# Patient Record
Sex: MALE | Race: WHITE | NOT HISPANIC OR LATINO
[De-identification: names, ages, dates, MRNs, and addresses within clinical notes are randomized per-mention and may not be internally consistent; named-entity substitution may affect disease eponyms.]

---

## 2017-10-06 ENCOUNTER — APPOINTMENT (OUTPATIENT)
Dept: MRI IMAGING | Facility: CLINIC | Age: 57
End: 2017-10-06

## 2017-10-06 ENCOUNTER — OUTPATIENT (OUTPATIENT)
Dept: OUTPATIENT SERVICES | Facility: HOSPITAL | Age: 57
LOS: 1 days | End: 2017-10-06
Payer: COMMERCIAL

## 2017-10-06 DIAGNOSIS — M25.512 PAIN IN LEFT SHOULDER: ICD-10-CM

## 2017-10-06 DIAGNOSIS — M79.622 PAIN IN LEFT UPPER ARM: ICD-10-CM

## 2017-10-06 DIAGNOSIS — Z00.8 ENCOUNTER FOR OTHER GENERAL EXAMINATION: ICD-10-CM

## 2017-10-06 PROCEDURE — 73221 MRI JOINT UPR EXTREM W/O DYE: CPT | Mod: 26,LT

## 2017-10-06 PROCEDURE — 73221 MRI JOINT UPR EXTREM W/O DYE: CPT

## 2018-01-06 ENCOUNTER — APPOINTMENT (OUTPATIENT)
Dept: MRI IMAGING | Facility: CLINIC | Age: 58
End: 2018-01-06

## 2018-01-06 ENCOUNTER — OUTPATIENT (OUTPATIENT)
Dept: OUTPATIENT SERVICES | Facility: HOSPITAL | Age: 58
LOS: 1 days | End: 2018-01-06
Payer: COMMERCIAL

## 2018-01-06 DIAGNOSIS — Z00.8 ENCOUNTER FOR OTHER GENERAL EXAMINATION: ICD-10-CM

## 2018-01-06 PROCEDURE — 73221 MRI JOINT UPR EXTREM W/O DYE: CPT

## 2018-01-06 PROCEDURE — 73221 MRI JOINT UPR EXTREM W/O DYE: CPT | Mod: 26,RT

## 2018-04-07 ENCOUNTER — APPOINTMENT (OUTPATIENT)
Dept: MRI IMAGING | Facility: CLINIC | Age: 58
End: 2018-04-07
Payer: COMMERCIAL

## 2018-04-07 ENCOUNTER — OUTPATIENT (OUTPATIENT)
Dept: OUTPATIENT SERVICES | Facility: HOSPITAL | Age: 58
LOS: 1 days | End: 2018-04-07
Payer: COMMERCIAL

## 2018-04-07 DIAGNOSIS — M54.2 CERVICALGIA: ICD-10-CM

## 2018-04-07 PROCEDURE — 72141 MRI NECK SPINE W/O DYE: CPT | Mod: 26

## 2018-04-07 PROCEDURE — 72141 MRI NECK SPINE W/O DYE: CPT

## 2018-04-12 DIAGNOSIS — M50.222 OTHER CERVICAL DISC DISPLACEMENT AT C5-C6 LEVEL: ICD-10-CM

## 2018-04-12 DIAGNOSIS — M50.221 OTHER CERVICAL DISC DISPLACEMENT AT C4-C5 LEVEL: ICD-10-CM

## 2018-04-12 DIAGNOSIS — M50.21 OTHER CERVICAL DISC DISPLACEMENT, HIGH CERVICAL REGION: ICD-10-CM

## 2018-04-12 DIAGNOSIS — M50.223 OTHER CERVICAL DISC DISPLACEMENT AT C6-C7 LEVEL: ICD-10-CM

## 2018-12-15 ENCOUNTER — OUTPATIENT (OUTPATIENT)
Dept: OUTPATIENT SERVICES | Facility: HOSPITAL | Age: 58
LOS: 1 days | End: 2018-12-15
Payer: COMMERCIAL

## 2018-12-15 ENCOUNTER — APPOINTMENT (OUTPATIENT)
Dept: RADIOLOGY | Facility: CLINIC | Age: 58
End: 2018-12-15
Payer: COMMERCIAL

## 2018-12-15 DIAGNOSIS — M53.3 SACROCOCCYGEAL DISORDERS, NOT ELSEWHERE CLASSIFIED: ICD-10-CM

## 2018-12-15 PROCEDURE — 72220 X-RAY EXAM SACRUM TAILBONE: CPT

## 2018-12-15 PROCEDURE — 72220 X-RAY EXAM SACRUM TAILBONE: CPT | Mod: 26

## 2019-01-05 ENCOUNTER — APPOINTMENT (OUTPATIENT)
Dept: ORTHOPEDIC SURGERY | Facility: CLINIC | Age: 59
End: 2019-01-05
Payer: COMMERCIAL

## 2019-01-05 VITALS — HEART RATE: 81 BPM | SYSTOLIC BLOOD PRESSURE: 122 MMHG | DIASTOLIC BLOOD PRESSURE: 72 MMHG

## 2019-01-05 DIAGNOSIS — Z87.39 PERSONAL HISTORY OF OTHER DISEASES OF THE MUSCULOSKELETAL SYSTEM AND CONNECTIVE TISSUE: ICD-10-CM

## 2019-01-05 DIAGNOSIS — Z80.9 FAMILY HISTORY OF MALIGNANT NEOPLASM, UNSPECIFIED: ICD-10-CM

## 2019-01-05 DIAGNOSIS — Z78.9 OTHER SPECIFIED HEALTH STATUS: ICD-10-CM

## 2019-01-05 DIAGNOSIS — Z86.69 PERSONAL HISTORY OF OTHER DISEASES OF THE NERVOUS SYSTEM AND SENSE ORGANS: ICD-10-CM

## 2019-01-05 DIAGNOSIS — G82.50 QUADRIPLEGIA, UNSPECIFIED: ICD-10-CM

## 2019-01-05 DIAGNOSIS — M60.9 MYOSITIS, UNSPECIFIED: ICD-10-CM

## 2019-01-05 PROCEDURE — 72040 X-RAY EXAM NECK SPINE 2-3 VW: CPT

## 2019-01-05 PROCEDURE — 99204 OFFICE O/P NEW MOD 45 MIN: CPT

## 2019-01-05 RX ORDER — MELOXICAM 15 MG/1
15 TABLET ORAL
Refills: 0 | Status: ACTIVE | COMMUNITY

## 2020-11-07 ENCOUNTER — APPOINTMENT (OUTPATIENT)
Dept: MRI IMAGING | Facility: CLINIC | Age: 60
End: 2020-11-07
Payer: COMMERCIAL

## 2020-11-07 ENCOUNTER — OUTPATIENT (OUTPATIENT)
Dept: OUTPATIENT SERVICES | Facility: HOSPITAL | Age: 60
LOS: 1 days | End: 2020-11-07
Payer: COMMERCIAL

## 2020-11-07 DIAGNOSIS — Z00.8 ENCOUNTER FOR OTHER GENERAL EXAMINATION: ICD-10-CM

## 2020-11-07 PROCEDURE — 70551 MRI BRAIN STEM W/O DYE: CPT

## 2020-11-07 PROCEDURE — 70551 MRI BRAIN STEM W/O DYE: CPT | Mod: 26

## 2020-12-24 ENCOUNTER — OUTPATIENT (OUTPATIENT)
Dept: OUTPATIENT SERVICES | Facility: HOSPITAL | Age: 60
LOS: 1 days | End: 2020-12-24
Payer: COMMERCIAL

## 2020-12-24 ENCOUNTER — APPOINTMENT (OUTPATIENT)
Dept: MRI IMAGING | Facility: CLINIC | Age: 60
End: 2020-12-24
Payer: COMMERCIAL

## 2020-12-24 DIAGNOSIS — Z00.8 ENCOUNTER FOR OTHER GENERAL EXAMINATION: ICD-10-CM

## 2020-12-24 PROCEDURE — 72141 MRI NECK SPINE W/O DYE: CPT

## 2020-12-24 PROCEDURE — 72141 MRI NECK SPINE W/O DYE: CPT | Mod: 26

## 2022-09-15 ENCOUNTER — APPOINTMENT (OUTPATIENT)
Dept: ORTHOPEDIC SURGERY | Facility: CLINIC | Age: 62
End: 2022-09-15

## 2022-09-15 VITALS — WEIGHT: 150 LBS | BODY MASS INDEX: 24.11 KG/M2 | HEIGHT: 66 IN

## 2022-09-15 PROCEDURE — 99214 OFFICE O/P EST MOD 30 MIN: CPT

## 2022-09-15 NOTE — IMAGING
Problem: Pain - Standard  Goal: Alleviation of pain or a reduction in pain to the patient’s comfort goal  Outcome: Progressing     Problem: Safety - Medical Restraint  Goal: Remains free of injury from restraints (Restraint for Interference with Medical Device)  Outcome: Progressing  Goal: Free from restraint(s) (Restraint for Interference with Medical Device)  Outcome: Not Progressing   The patient is Watcher - Medium risk of patient condition declining or worsening    Shift Goals  Clinical Goals: Neuro assessment Q 2hrs and hemodynamic stability  Patient Goals: FELIPA  Family Goals: FELIPA    Progress made toward(s) clinical / shift goals:  Neuro checks every two hours. Pt restraints assessed every 2 hours and prn. Pt pulling at lines and tubes. Pt given pain medication per pain scale. Pt resting comfortably after pain medication.     Patient is not progressing towards the following goals:      Problem: Safety - Medical Restraint  Goal: Free from restraint(s) (Restraint for Interference with Medical Device)  Outcome: Not Progressing      [de-identified] : The patient uses a wheelchair\par \par Cervical spine\par Inspection normal\par Mild tenderness trapezius bilaterally\par Negative foraminal closing test\par Motor upper extremities-supraspinatus strength is 5 minus/5 bilaterally, remainder normal\par \par Right shoulder\par No swelling\par Active forward flexion 170°, external rotation 60°, internal rotation lower lumbar region.  \par Mild tenderness anterior and posterior shoulder\par Mild crepitation with passive motion\par Radial pulse 2+\par \par Left shoulder\par No swelling\par Active forward flexion 170°, external rotation 60°, internal rotation lower lumbar region\par Mild tenderness anterior shoulder\par Mild crepitation with passive motion\par Radial pulse 2+\par

## 2022-09-15 NOTE — REASON FOR VISIT
[FreeTextEntry2] : Patient complains of increased Neck pain Continued pain B Shoulders.///////LAST SEEN 3/31/2022

## 2022-09-15 NOTE — DATA REVIEWED
[MRI] : MRI [Cervical Spine] : cervical spine [I independently reviewed and interpreted images and report] : I independently reviewed and interpreted images and report [FreeTextEntry1] : MRI cervical spine done in [place] August 22, 2022 was reviewed. There is multilevel degenerative disc disease.  Mild to moderate kyphosis. There is mild disc/osteophyte at C3-4.  Mild to moderate distress osteophyte at C4-5 with mild stenosis.  Moderate disc/osteophyte at C5-6 with moderate stenosis and cord compression.  The stenosis at C5-6 appears to have progressed compared to prior MRI done in [facility] December 24, 2020

## 2022-09-15 NOTE — HISTORY OF PRESENT ILLNESS
[Neck] : neck [Gradual] : gradual [Dull/Aching] : dull/aching [Intermittent] : intermittent [Leisure] : leisure [Rest] : rest [Retired] : Work status: retired [de-identified] : The patient has had some increased back pain over the past several months.  He has mild to moderate pain with movement.  Pain radiates to his right posterior parascapular region.  No numbness or weakness in his upper extremities.  He had MRI cervical spine\par He has continued mild to moderate pain in her shoulders reaching above him and behind him.  Occasional awakening from sleep.  Taking ibuprofen p.r.n..  He has not been taking meloxicam [] : Post Surgical Visit: no [de-identified] : 3/31/2022 [de-identified] : Dr. Wiley  [de-identified] : MRI

## 2022-09-15 NOTE — DISCUSSION/SUMMARY
[de-identified] : Continue home exercises\par Moist heat to his neck p.r.n.\par Ice to his shoulders p.r.n.\par Continue ibuprofen p.r.n.\par The patient stop working January 2, 2019 secondary to physical limitations\par Recommend he have neurosurgery consult with Dr. Edgardo Restrepo to discuss long-term treatment options.  Also recommend he have followup spine consult with Dr. Man Gautam in [place] where he lives\par If for any reason he develops any significant neurologic changes in upper extremities, weakness, advised to go to emergency room immediately\par \par Impression:\par Cervical strain/spondylosis/stenosis\par Mild to moderate osteoarthritis right shoulder/rotator cuff tendinitis\par Mild loss arthritis left shoulder/rotator cuff tendinitis\par

## 2023-03-30 ENCOUNTER — APPOINTMENT (OUTPATIENT)
Dept: ORTHOPEDIC SURGERY | Facility: CLINIC | Age: 63
End: 2023-03-30
Payer: COMMERCIAL

## 2023-03-30 VITALS — BODY MASS INDEX: 24.11 KG/M2 | WEIGHT: 150 LBS | HEIGHT: 66 IN

## 2023-03-30 PROCEDURE — 99214 OFFICE O/P EST MOD 30 MIN: CPT

## 2023-03-30 NOTE — IMAGING
[de-identified] : The patient uses a wheelchair\par \par Cervical spine\par Inspection normal\par Mild tenderness trapezius bilaterally\par Negative foraminal closing test\par Motor upper extremities-supraspinatus strength is 5 minus/5 bilaterally, remainder normal\par \par Right shoulder and arm\par Prominence of the biceps muscle belly mid arm\par Active forward flexion 170°, external rotation 60°, internal rotation lower lumbar region.  \par Mild tenderness anterior and posterior shoulder\par Mild crepitation with passive motion\par Radial pulse 2+\par \par Left shoulder\par No swelling\par Active forward flexion 170°, external rotation 60°, internal rotation lower lumbar region\par Mild tenderness anterior shoulder\par Mild crepitation with passive motion\par Radial pulse 2+\par

## 2023-03-30 NOTE — DISCUSSION/SUMMARY
[de-identified] : Continue home exercises\par Moist heat to his neck p.r.n.\par Ice to his shoulders p.r.n.\par Continue ibuprofen p.r.n.\par The patient stop working January 2, 2019 secondary to physical limitations\par Continue spine follow-up with Dr. Man Gautam in [place] where he lives\par \par Impression:\par Cervical strain/spondylosis/stenosis\par Mild to moderate osteoarthritis right shoulder/rotator cuff tendinitis\par Status post rupture right proximal biceps tendon\par Mild osteoarthritis left shoulder/rotator cuff tendinitis\par

## 2023-03-30 NOTE — HISTORY OF PRESENT ILLNESS
[Neck] : neck [Left Arm] : left arm [Right Arm] : right arm [Gradual] : gradual [5] : 5 [Intermittent] : intermittent [Leisure] : leisure [Rest] : rest [Exercising] : exercising [Retired] : Work status: retired [de-identified] : The patient has continued neck and bilateral shoulder pain\par He ruptured his right biceps tendon 2 weeks ago lifting his wheelchair.  He had evaluation in [place] and MRI.  He is feeling better now.\par He has mild to moderate pain and stiffness in his neck with movement.  No radicular complaints\par He has continued mild to moderate pain in his shoulders reaching above him and behind him.  Occasional awakening from sleep at night.  Taking meloxicam as needed [] : Post Surgical Visit: no [de-identified] : 9/15/2022 [de-identified] :

## 2023-06-30 ENCOUNTER — OFFICE (OUTPATIENT)
Dept: URBAN - METROPOLITAN AREA CLINIC 109 | Facility: CLINIC | Age: 63
Setting detail: OPHTHALMOLOGY
End: 2023-06-30
Payer: COMMERCIAL

## 2023-06-30 DIAGNOSIS — H34.211: ICD-10-CM

## 2023-06-30 DIAGNOSIS — H40.033: ICD-10-CM

## 2023-06-30 DIAGNOSIS — H02.834: ICD-10-CM

## 2023-06-30 DIAGNOSIS — H35.362: ICD-10-CM

## 2023-06-30 DIAGNOSIS — H02.831: ICD-10-CM

## 2023-06-30 DIAGNOSIS — H25.13: ICD-10-CM

## 2023-06-30 PROCEDURE — 99213 OFFICE O/P EST LOW 20 MIN: CPT | Performed by: OPHTHALMOLOGY

## 2023-06-30 PROCEDURE — 92020 GONIOSCOPY: CPT | Performed by: OPHTHALMOLOGY

## 2023-06-30 ASSESSMENT — REFRACTION_MANIFEST
OS_SPHERE: +2.50
OD_VA1: 20/20
OD_SPHERE: +3.00
OS_VA1: 20/20

## 2023-06-30 ASSESSMENT — CONFRONTATIONAL VISUAL FIELD TEST (CVF)
OD_FINDINGS: FULL
OS_FINDINGS: FULL

## 2023-06-30 ASSESSMENT — REFRACTION_CURRENTRX
OS_OVR_VA: 20/
OS_SPHERE: +1.75
OD_OVR_VA: 20/
OD_SPHERE: +1.75

## 2023-06-30 ASSESSMENT — VISUAL ACUITY
OS_BCVA: 20/20-
OD_BCVA: 20/20-

## 2023-06-30 ASSESSMENT — TONOMETRY
OD_IOP_MMHG: 15
OS_IOP_MMHG: 15

## 2023-09-06 ENCOUNTER — APPOINTMENT (OUTPATIENT)
Dept: ORTHOPEDIC SURGERY | Facility: CLINIC | Age: 63
End: 2023-09-06

## 2023-12-21 ENCOUNTER — APPOINTMENT (OUTPATIENT)
Dept: ORTHOPEDIC SURGERY | Facility: CLINIC | Age: 63
End: 2023-12-21
Payer: COMMERCIAL

## 2023-12-21 VITALS — BODY MASS INDEX: 24.11 KG/M2 | HEIGHT: 66 IN | WEIGHT: 150 LBS

## 2023-12-21 DIAGNOSIS — M48.02 SPINAL STENOSIS, CERVICAL REGION: ICD-10-CM

## 2023-12-21 DIAGNOSIS — M75.51 BURSITIS OF RIGHT SHOULDER: ICD-10-CM

## 2023-12-21 DIAGNOSIS — M19.011 PRIMARY OSTEOARTHRITIS, RIGHT SHOULDER: ICD-10-CM

## 2023-12-21 DIAGNOSIS — M47.812 SPONDYLOSIS W/OUT MYELOPATHY OR RADICULOPATHY, CERVICAL REGION: ICD-10-CM

## 2023-12-21 DIAGNOSIS — G82.20 PARAPLEGIA, UNSPECIFIED: ICD-10-CM

## 2023-12-21 DIAGNOSIS — M19.012 PRIMARY OSTEOARTHRITIS, RIGHT SHOULDER: ICD-10-CM

## 2023-12-21 DIAGNOSIS — M75.52 BURSITIS OF LEFT SHOULDER: ICD-10-CM

## 2023-12-21 DIAGNOSIS — S46.211A STRAIN OF MUSCLE, FASCIA AND TENDON OF OTHER PARTS OF BICEPS, RIGHT ARM, INITIAL ENCOUNTER: ICD-10-CM

## 2023-12-21 PROCEDURE — 99214 OFFICE O/P EST MOD 30 MIN: CPT

## 2023-12-21 RX ORDER — METHOCARBAMOL 500 MG/1
500 TABLET, FILM COATED ORAL
Qty: 90 | Refills: 2 | Status: ACTIVE | COMMUNITY
Start: 2023-12-21 | End: 1900-01-01

## 2023-12-21 NOTE — DISCUSSION/SUMMARY
[Medication Risks Reviewed] : Medication risks reviewed [de-identified] : Continue home exercises Moist heat to his neck p.r.n. Ice to his shoulders p.r.n. Continue ibuprofen p.r.n. or meloxicam prn.  He can try methocarbamol 500 mg 1-2 every 8 hours as needed pain/spasm.  Not to take this if he is driving or needs to be alert The patient stop working January 2, 2019 secondary to physical limitations Continue spine follow-up with Dr. Man Gautam in [place] where he lives Continue motorized assist when traveling in his wheelchair long distances I discussed possible hyaluronate injections for his shoulders  Impression: Cervical strain/spondylosis/stenosis Mild to moderate osteoarthritis right shoulder/rotator cuff tendinitis Status post rupture right proximal biceps tendon Mild osteoarthritis left shoulder/rotator cuff tendinitis

## 2023-12-21 NOTE — IMAGING
[de-identified] : The patient uses a wheelchair  Cervical spine Inspection normal Mild to moderate decrease in active range of motion Mild tenderness trapezius bilaterally.  Mild spasm trapezius bilaterally Negative foraminal closing test Motor upper extremities-supraspinatus strength is 5 minus/5 bilaterally, remainder normal  Right shoulder and arm Prominence of the biceps muscle belly mid arm Active forward flexion 160, external rotation 60, internal rotation lower lumbar region.   Mild tenderness anterior and posterior shoulder Mild crepitation with passive motion Radial pulse 2+  Left shoulder No swelling Active forward flexion 170, external rotation 60, internal rotation lower lumbar region Mild tenderness anterior shoulder Mild crepitation with passive motion Radial pulse 2+

## 2023-12-21 NOTE — HISTORY OF PRESENT ILLNESS
[Neck] : neck [Left Arm] : left arm [Right Arm] : right arm [Gradual] : gradual [7] : 7 [Intermittent] : intermittent [Leisure] : leisure [Rest] : rest [Retired] : Work status: retired [de-identified] : The patient has continued neck and bilateral shoulder pain He has mild to moderate pain and stiffness in his neck with movement.  No radicular complaints.  Occasional spasms He has continued pain in both shoulders.  Mild to moderate pain reaching above him and behind him.  Occasional awakening from sleep at night.  He has been using motorized device to assist with mobility when he goes long distances in his wheelchair [] : Post Surgical Visit: no [de-identified] : 3/30/2023 [de-identified] :

## 2024-03-06 ENCOUNTER — APPOINTMENT (RX ONLY)
Dept: URBAN - METROPOLITAN AREA CLINIC 146 | Facility: CLINIC | Age: 64
Setting detail: DERMATOLOGY
End: 2024-03-06

## 2024-03-06 DIAGNOSIS — L82.1 OTHER SEBORRHEIC KERATOSIS: ICD-10-CM

## 2024-03-06 DIAGNOSIS — L21.8 OTHER SEBORRHEIC DERMATITIS: ICD-10-CM | Status: INADEQUATELY CONTROLLED

## 2024-03-06 DIAGNOSIS — L81.4 OTHER MELANIN HYPERPIGMENTATION: ICD-10-CM

## 2024-03-06 PROCEDURE — ? PRESCRIPTION

## 2024-03-06 PROCEDURE — 99204 OFFICE O/P NEW MOD 45 MIN: CPT

## 2024-03-06 PROCEDURE — ? COUNSELING

## 2024-03-06 RX ORDER — CLINDAMYCIN PHOSPHATE 10 MG/ML
SOLUTION TOPICAL
Qty: 60 | Refills: 1 | Status: ERX | COMMUNITY
Start: 2024-03-06

## 2024-03-06 RX ORDER — CLOBETASOL PROPIONATE 0.5 MG/ML
SOLUTION TOPICAL BID
Qty: 50 | Refills: 0 | Status: ERX | COMMUNITY
Start: 2024-03-06

## 2024-03-06 RX ADMIN — CLOBETASOL PROPIONATE: 0.5 SOLUTION TOPICAL at 00:00

## 2024-03-06 RX ADMIN — CLINDAMYCIN PHOSPHATE: 10 SOLUTION TOPICAL at 00:00

## 2024-03-06 ASSESSMENT — LOCATION DETAILED DESCRIPTION DERM: LOCATION DETAILED: LEFT CENTRAL MALAR CHEEK

## 2024-03-06 ASSESSMENT — LOCATION SIMPLE DESCRIPTION DERM: LOCATION SIMPLE: LEFT CHEEK

## 2024-03-06 ASSESSMENT — LOCATION ZONE DERM: LOCATION ZONE: FACE

## 2024-03-06 NOTE — PROCEDURE: MIPS QUALITY
Quality 47: Advance Care Plan: Advance Care Planning discussed and documented; advance care plan or surrogate decision maker documented in the medical record.
Name And Contact Information For Health Care Proxy: Charito Simon
Detail Level: Detailed
Quality 226: Preventive Care And Screening: Tobacco Use: Screening And Cessation Intervention: Patient screened for tobacco use and is an ex/non-smoker
Quality 130: Documentation Of Current Medications In The Medical Record: Current Medications Documented

## 2024-07-03 ENCOUNTER — APPOINTMENT (OUTPATIENT)
Dept: ORTHOPEDIC SURGERY | Facility: CLINIC | Age: 64
End: 2024-07-03
Payer: COMMERCIAL

## 2024-07-03 VITALS — HEIGHT: 66 IN | WEIGHT: 150 LBS | BODY MASS INDEX: 24.11 KG/M2

## 2024-07-03 DIAGNOSIS — M48.02 SPINAL STENOSIS, CERVICAL REGION: ICD-10-CM

## 2024-07-03 DIAGNOSIS — M75.51 BURSITIS OF RIGHT SHOULDER: ICD-10-CM

## 2024-07-03 DIAGNOSIS — M47.812 SPONDYLOSIS W/OUT MYELOPATHY OR RADICULOPATHY, CERVICAL REGION: ICD-10-CM

## 2024-07-03 DIAGNOSIS — M75.52 BURSITIS OF LEFT SHOULDER: ICD-10-CM

## 2024-07-03 DIAGNOSIS — M19.011 PRIMARY OSTEOARTHRITIS, RIGHT SHOULDER: ICD-10-CM

## 2024-07-03 DIAGNOSIS — G82.20 PARAPLEGIA, UNSPECIFIED: ICD-10-CM

## 2024-07-03 DIAGNOSIS — M19.012 PRIMARY OSTEOARTHRITIS, RIGHT SHOULDER: ICD-10-CM

## 2024-07-03 DIAGNOSIS — S46.211A STRAIN OF MUSCLE, FASCIA AND TENDON OF OTHER PARTS OF BICEPS, RIGHT ARM, INITIAL ENCOUNTER: ICD-10-CM

## 2024-07-03 PROCEDURE — 99214 OFFICE O/P EST MOD 30 MIN: CPT

## 2024-08-29 ENCOUNTER — OFFICE (OUTPATIENT)
Dept: URBAN - METROPOLITAN AREA CLINIC 109 | Facility: CLINIC | Age: 64
Setting detail: OPHTHALMOLOGY
End: 2024-08-29
Payer: COMMERCIAL

## 2024-08-29 DIAGNOSIS — H34.211: ICD-10-CM

## 2024-08-29 DIAGNOSIS — H25.13: ICD-10-CM

## 2024-08-29 DIAGNOSIS — H02.831: ICD-10-CM

## 2024-08-29 DIAGNOSIS — H40.033: ICD-10-CM

## 2024-08-29 DIAGNOSIS — H02.834: ICD-10-CM

## 2024-08-29 DIAGNOSIS — H35.362: ICD-10-CM

## 2024-08-29 PROCEDURE — 92014 COMPRE OPH EXAM EST PT 1/>: CPT | Performed by: OPHTHALMOLOGY

## 2024-08-29 ASSESSMENT — CONFRONTATIONAL VISUAL FIELD TEST (CVF)
OD_FINDINGS: FULL
OS_FINDINGS: FULL

## 2025-03-21 ENCOUNTER — RX RENEWAL (OUTPATIENT)
Age: 65
End: 2025-03-21

## 2025-08-27 ENCOUNTER — APPOINTMENT (OUTPATIENT)
Dept: ORTHOPEDIC SURGERY | Facility: CLINIC | Age: 65
End: 2025-08-27
Payer: COMMERCIAL

## 2025-08-27 VITALS — WEIGHT: 150 LBS | BODY MASS INDEX: 24.11 KG/M2 | HEIGHT: 66 IN

## 2025-08-27 DIAGNOSIS — M75.51 BURSITIS OF RIGHT SHOULDER: ICD-10-CM

## 2025-08-27 DIAGNOSIS — M19.011 PRIMARY OSTEOARTHRITIS, RIGHT SHOULDER: ICD-10-CM

## 2025-08-27 DIAGNOSIS — S16.1XXD STRAIN OF MUSCLE, FASCIA AND TENDON AT NECK LEVEL, SUBSEQUENT ENCOUNTER: ICD-10-CM

## 2025-08-27 DIAGNOSIS — M19.012 PRIMARY OSTEOARTHRITIS, RIGHT SHOULDER: ICD-10-CM

## 2025-08-27 DIAGNOSIS — M75.52 BURSITIS OF LEFT SHOULDER: ICD-10-CM

## 2025-08-27 DIAGNOSIS — M48.02 SPINAL STENOSIS, CERVICAL REGION: ICD-10-CM

## 2025-08-27 DIAGNOSIS — M47.812 SPONDYLOSIS W/OUT MYELOPATHY OR RADICULOPATHY, CERVICAL REGION: ICD-10-CM

## 2025-08-27 DIAGNOSIS — G82.20 PARAPLEGIA, UNSPECIFIED: ICD-10-CM

## 2025-08-27 DIAGNOSIS — S46.211A STRAIN OF MUSCLE, FASCIA AND TENDON OF OTHER PARTS OF BICEPS, RIGHT ARM, INITIAL ENCOUNTER: ICD-10-CM

## 2025-08-27 PROCEDURE — 99213 OFFICE O/P EST LOW 20 MIN: CPT

## 2025-08-28 ENCOUNTER — OFFICE (OUTPATIENT)
Dept: URBAN - METROPOLITAN AREA CLINIC 109 | Facility: CLINIC | Age: 65
Setting detail: OPHTHALMOLOGY
End: 2025-08-28
Payer: COMMERCIAL

## 2025-08-28 DIAGNOSIS — H02.834: ICD-10-CM

## 2025-08-28 DIAGNOSIS — H34.211: ICD-10-CM

## 2025-08-28 DIAGNOSIS — H25.13: ICD-10-CM

## 2025-08-28 DIAGNOSIS — H02.832: ICD-10-CM

## 2025-08-28 DIAGNOSIS — H35.362: ICD-10-CM

## 2025-08-28 DIAGNOSIS — H40.033: ICD-10-CM

## 2025-08-28 DIAGNOSIS — H02.835: ICD-10-CM

## 2025-08-28 PROCEDURE — 92014 COMPRE OPH EXAM EST PT 1/>: CPT | Performed by: OPHTHALMOLOGY

## 2025-08-28 PROCEDURE — 92020 GONIOSCOPY: CPT | Performed by: OPHTHALMOLOGY

## 2025-08-28 ASSESSMENT — LID POSITION - DERMATOCHALASIS
OS_DERMATOCHALASIS: LLL LUL 1+
OD_DERMATOCHALASIS: RLL RUL 1+

## 2025-08-28 ASSESSMENT — REFRACTION_AUTOREFRACTION
OD_SPHERE: +3.25
OD_AXIS: 80
OS_AXIS: 100
OS_SPHERE: +3.00
OD_CYLINDER: -1.00
OS_CYLINDER: -0.75

## 2025-08-28 ASSESSMENT — REFRACTION_MANIFEST
OS_SPHERE: +2.50
OS_VA1: 20/20
OD_SPHERE: +3.00
OD_VA1: 20/20

## 2025-08-28 ASSESSMENT — REFRACTION_CURRENTRX
OD_OVR_VA: 20/
OS_SPHERE: +1.75
OD_SPHERE: +1.75
OS_OVR_VA: 20/

## 2025-08-28 ASSESSMENT — CONFRONTATIONAL VISUAL FIELD TEST (CVF)
OS_FINDINGS: FULL
OD_FINDINGS: FULL

## 2025-08-28 ASSESSMENT — TONOMETRY
OD_IOP_MMHG: 11
OS_IOP_MMHG: 17
OD_IOP_MMHG: 16
OS_IOP_MMHG: 13

## 2025-08-28 ASSESSMENT — VISUAL ACUITY
OS_BCVA: 20/20
OD_BCVA: 20/20-2